# Patient Record
Sex: MALE | Race: WHITE | Employment: FULL TIME | URBAN - METROPOLITAN AREA
[De-identification: names, ages, dates, MRNs, and addresses within clinical notes are randomized per-mention and may not be internally consistent; named-entity substitution may affect disease eponyms.]

---

## 2018-03-22 ENCOUNTER — HOSPITAL ENCOUNTER (OUTPATIENT)
Age: 28
Discharge: ANOTHER HOSPITAL | End: 2018-03-22
Attending: FAMILY MEDICINE
Payer: COMMERCIAL

## 2018-03-22 VITALS
DIASTOLIC BLOOD PRESSURE: 82 MMHG | HEIGHT: 74 IN | SYSTOLIC BLOOD PRESSURE: 150 MMHG | BODY MASS INDEX: 34.65 KG/M2 | OXYGEN SATURATION: 100 % | HEART RATE: 82 BPM | TEMPERATURE: 98 F | RESPIRATION RATE: 16 BRPM | WEIGHT: 270 LBS

## 2018-03-22 DIAGNOSIS — T78.40XA ALLERGIC REACTION, INITIAL ENCOUNTER: Primary | ICD-10-CM

## 2018-03-22 PROCEDURE — 99202 OFFICE O/P NEW SF 15 MIN: CPT

## 2018-03-22 PROCEDURE — 99203 OFFICE O/P NEW LOW 30 MIN: CPT

## 2018-03-22 RX ORDER — LEVOTHYROXINE SODIUM 88 UG/1
88 TABLET ORAL
COMMUNITY

## 2018-03-22 RX ORDER — OMEPRAZOLE 20 MG/1
20 CAPSULE, DELAYED RELEASE ORAL
COMMUNITY

## 2018-03-22 NOTE — ED PROVIDER NOTES
Patient Seen in: [facility]    History   Patient presents with:   Allergic Rxn Allergies (immune)    Stated Complaint: Rash    HPI  27-year-old male with a past medical history significant for thyroid disease presents wit well-developed and well-nourished. No distress. No stridor   HENT:   Mouth/Throat: Oropharynx is clear and moist. No oropharyngeal exudate. Tonsils absent. No swelling in the oral cavity or perioral region   Neck: Normal range of motion. Neck supple.

## 2018-03-22 NOTE — ED INITIAL ASSESSMENT (HPI)
Per pt having hives to forehead that started approximately around 130 today took over the counter benadryl. Pt reports hives spread to back of neck and small of back went to an immediate care was give RX for prednisone and \"shot\" of steroid.  Pt reports h

## 2018-03-23 NOTE — ED NOTES
All orders complete pt to go to ED for further evaluation of symptoms. Pt denies SOB no drooling no stridor noted. Pt denies any new foods or products.  Leaving IC stable will go by car to [facility] OP

## 2020-02-19 ENCOUNTER — OFFICE VISIT (OUTPATIENT)
Dept: SLEEP MEDICINE | Age: 30
End: 2020-02-19

## 2020-02-19 DIAGNOSIS — G47.33 OSA (OBSTRUCTIVE SLEEP APNEA): ICD-10-CM

## 2020-02-19 PROCEDURE — 95810 POLYSOM 6/> YRS 4/> PARAM: CPT

## 2020-03-12 ENCOUNTER — APPOINTMENT (OUTPATIENT)
Dept: SLEEP MEDICINE | Age: 30
End: 2020-03-12

## 2020-03-12 DIAGNOSIS — G47.33 OSA (OBSTRUCTIVE SLEEP APNEA): ICD-10-CM

## 2020-03-12 PROCEDURE — 95811 POLYSOM 6/>YRS CPAP 4/> PARM: CPT

## 2020-10-08 ENCOUNTER — HOSPITAL ENCOUNTER (OUTPATIENT)
Dept: ULTRASOUND IMAGING | Age: 30
Discharge: HOME | End: 2020-10-08
Attending: FAMILY MEDICINE

## 2020-10-08 DIAGNOSIS — R74.8 ABNORMAL LIVER ENZYMES: ICD-10-CM

## 2020-10-08 PROCEDURE — 76705 ECHO EXAM OF ABDOMEN: CPT
